# Patient Record
Sex: FEMALE | Race: WHITE | Employment: OTHER | URBAN - METROPOLITAN AREA
[De-identification: names, ages, dates, MRNs, and addresses within clinical notes are randomized per-mention and may not be internally consistent; named-entity substitution may affect disease eponyms.]

---

## 2023-09-23 RX ORDER — CITALOPRAM 10 MG/1
TABLET ORAL
COMMUNITY

## 2023-09-23 RX ORDER — ROSUVASTATIN CALCIUM 5 MG/1
TABLET, COATED ORAL
COMMUNITY

## 2023-09-23 RX ORDER — CETIRIZINE HYDROCHLORIDE 10 MG/1
TABLET ORAL
COMMUNITY

## 2023-09-23 RX ORDER — MULTIVITAMIN/IRON/FOLIC ACID 18MG-0.4MG
TABLET ORAL
COMMUNITY

## 2023-09-23 RX ORDER — LOSARTAN POTASSIUM 100 MG/1
TABLET ORAL
COMMUNITY

## 2023-10-05 ENCOUNTER — APPOINTMENT (OUTPATIENT)
Dept: ORTHOPEDIC SURGERY | Facility: HOSPITAL | Age: 78
End: 2023-10-05
Payer: MEDICARE

## 2023-10-12 ENCOUNTER — OFFICE VISIT (OUTPATIENT)
Dept: ORTHOPEDIC SURGERY | Facility: HOSPITAL | Age: 78
End: 2023-10-12
Payer: MEDICARE

## 2023-10-12 DIAGNOSIS — M65.331 TRIGGER MIDDLE FINGER OF RIGHT HAND: Primary | ICD-10-CM

## 2023-10-12 PROCEDURE — 1036F TOBACCO NON-USER: CPT | Performed by: ORTHOPAEDIC SURGERY

## 2023-10-12 PROCEDURE — 1160F RVW MEDS BY RX/DR IN RCRD: CPT | Performed by: ORTHOPAEDIC SURGERY

## 2023-10-12 PROCEDURE — 1159F MED LIST DOCD IN RCRD: CPT | Performed by: ORTHOPAEDIC SURGERY

## 2023-10-12 PROCEDURE — 99213 OFFICE O/P EST LOW 20 MIN: CPT | Performed by: ORTHOPAEDIC SURGERY

## 2023-10-12 ASSESSMENT — PATIENT HEALTH QUESTIONNAIRE - PHQ9
SUM OF ALL RESPONSES TO PHQ9 QUESTIONS 1 AND 2: 0
1. LITTLE INTEREST OR PLEASURE IN DOING THINGS: NOT AT ALL
2. FEELING DOWN, DEPRESSED OR HOPELESS: NOT AT ALL

## 2023-10-12 NOTE — PROGRESS NOTES
Reason for Appointment  Chief Complaint   Patient presents with    Left Middle Finger - Trigger Finger    Right Middle Finger - Trigger Finger       History of Present Illness  Patient is a 78 y.o. female here today for follow-up evaluation of with great improvement right and left long fingers but with more improvement in the left long finger with no significant symptoms still has some mild catching in the right long finger but this is the digit that had a flexion contracture.  Very happy with results still using a night splint which helps.  Pain much better.  No other changes in her past medical history allergies and medications.     History reviewed. No pertinent past medical history.    History reviewed. No pertinent surgical history.    Medication Documentation Review Audit       Reviewed by Xiomara Calloway MA (Medical Assistant) on 10/12/23 at 1309      Medication Order Taking? Sig Documenting Provider Last Dose Status   AMLODIPINE BENZOATE ORAL [id number] Yes amLODIPine Benzoate Historical Provider, MD Taking Active   b complex 0.4 mg tablet [id number] Yes Vitamin B Complex Historical Provider, MD Taking Active   cetirizine (ZyrTEC) 10 mg tablet [id number] Yes Cetirizine HCl Historical Provider, MD Taking Active   citalopram (CeleXA) 10 mg tablet [id number] Yes Citalopram Hydrobromide Historical Provider, MD Taking Active   losartan (Cozaar) 100 mg tablet [id number] Yes Losartan Potassium Historical Provider, MD Taking Active   metformin HCl (METFORMIN ORAL) [id number] Yes Metformin Historical Provider, MD Taking Active   OXYBUTYNIN TD [id number] Yes Oxybutynin Historical Provider, MD Taking Active   rosuvastatin (Crestor) 5 mg tablet [id number] Yes Rosuvastatin Calcium Historical Provider, MD Taking Active                    Allergies   Allergen Reactions    Meloxicam Unknown    Solifenacin Succinate Unknown    Sulfa (Sulfonamide Antibiotics) Unknown       Review of Systems    Exam on exam today there is about a  20 degree flexion contracture of the right long finger that can be corrected to 10 degrees there is still some triggering this area but much less tenderness she can make a full fist in both long fingers no other triggering throughout the digits with just mild DJD in the hands good pulses good sensation    Assessment   Bilateral long finger trigger digits    Plan we did discuss future injections if this lasts for a few more months versus surgical intervention which can be done under local anesthesia, she will return when symptoms recur

## 2024-09-19 ENCOUNTER — OFFICE VISIT (OUTPATIENT)
Dept: ORTHOPEDIC SURGERY | Facility: HOSPITAL | Age: 79
End: 2024-09-19
Payer: MEDICARE

## 2024-09-19 DIAGNOSIS — M65.332 TRIGGER MIDDLE FINGER OF LEFT HAND: Primary | ICD-10-CM

## 2024-09-19 DIAGNOSIS — M19.042 DEGENERATIVE ARTHRITIS OF MIDDLE FINGER OF LEFT HAND: ICD-10-CM

## 2024-09-19 PROCEDURE — 2500000005 HC RX 250 GENERAL PHARMACY W/O HCPCS: Performed by: ORTHOPAEDIC SURGERY

## 2024-09-19 PROCEDURE — 99213 OFFICE O/P EST LOW 20 MIN: CPT | Performed by: ORTHOPAEDIC SURGERY

## 2024-09-19 PROCEDURE — 76942 ECHO GUIDE FOR BIOPSY: CPT | Performed by: ORTHOPAEDIC SURGERY

## 2024-09-19 ASSESSMENT — PAIN - FUNCTIONAL ASSESSMENT: PAIN_FUNCTIONAL_ASSESSMENT: 0-10

## 2024-09-19 ASSESSMENT — PAIN SCALES - GENERAL: PAINLEVEL_OUTOF10: 0 - NO PAIN

## 2024-09-19 NOTE — PROGRESS NOTES
Reason for Appointment  Chief Complaint   Patient presents with    Left Hand - Trigger Finger     History of Present Illness  Patient is a 79 y.o. female here today for follow-up evaluation of the left hand. Pt was last seen on 10/12/2023. Over the past couple of months the triggering has happened again in the left middle finger. She is noticing a small flexion contracture. No numbness or injuries. No other changes in past medical history, allergies, or medications.  Pain in the fingers worse with gripping better with rest she did get a small flexion contracture in the ring finger, but no longer has any triggering      History reviewed. No pertinent past medical history.    History reviewed. No pertinent surgical history.    Medication Documentation Review Audit       Reviewed by Xiomara Koroma MA (Medical Assistant) on 09/19/24 at 1345      Medication Order Taking? Sig Documenting Provider Last Dose Status   AMLODIPINE BENZOATE ORAL [id number] Yes amLODIPine Benzoate Historical Provider, MD Taking Active   b complex 0.4 mg tablet [id number] Yes Vitamin B Complex Historical Provider, MD Taking Active   cetirizine (ZyrTEC) 10 mg tablet [id number] Yes Cetirizine HCl Historical Provider, MD Taking Active   citalopram (CeleXA) 10 mg tablet [id number] Yes Citalopram Hydrobromide Historical Provider, MD Taking Active   losartan (Cozaar) 100 mg tablet [id number] Yes Losartan Potassium Historical Provider, MD Taking Active   metformin HCl (METFORMIN ORAL) [id number] Yes Metformin Historical Provider, MD Taking Active   OXYBUTYNIN TD [id number] Yes Oxybutynin Historical Provider, MD Taking Active   rosuvastatin (Crestor) 5 mg tablet [id number] Yes Rosuvastatin Calcium Historical Provider, MD Taking Active                    Allergies   Allergen Reactions    Meloxicam Unknown    Solifenacin Succinate Unknown    Sulfa (Sulfonamide Antibiotics) Unknown       Review of Systems    Exam   Tender A1 pulley left long finger. Mild flexion  contracture left long finger. Palpable triggering. Good cap refill. Good sensation throughout the hand. Mild arthritic changes seen.    Assessment   Left arthritic middle finger  Left trigger long finger     Plan   We are going to inject her long finger today, but did discuss the benefits and risks of operative treatment. Since she responded well to the last injection pt wants to go the injection route today. Follow up as needed.    Patient ID: Good John is a 79 y.o. female.    Hand / UE Inj/Asp: L long A1 for trigger finger on 9/19/2024 2:04 PM  Indications: pain  Details: 25 G needle, ultrasound-guided  Medications: 1 mL lidocaine 10 mg/mL (1 %)  Outcome: tolerated well, no immediate complications    After discussing the risks and benefits of the procedure we proceeded with the injection. Under ultrasound guidance we identified the metacarpal bone and overlying tendon sheath with the FDS and FDP tendons, images obtained. We then sterilely injected the left long finger A1 pulley with a mixture of 20 mg of DepoMedrol and .5 cc of 1% lidocaine. Pt tolerated the procedure well without any adverse reactions.    Procedure, treatment alternatives, risks and benefits explained, specific risks discussed. Consent was given by the patient. Immediately prior to procedure a time out was called to verify the correct patient, procedure, equipment, support staff and site/side marked as required. Patient was prepped and draped in the usual sterile fashion.           I, Rhiannon Rene, attest that this documentation has been prepared under the direction and in the presence of Tate Diop MD. By signing below, ITate MD, personally performed the services described in this documentation. All medical record entries made by the scribe were at my direction and in my presence. I have reviewed the chart and agree that the record reflects my personal performance and is accurate and complete.

## 2024-09-20 RX ORDER — LIDOCAINE HYDROCHLORIDE 10 MG/ML
1 INJECTION, SOLUTION INFILTRATION; PERINEURAL
Status: COMPLETED | OUTPATIENT
Start: 2024-09-19 | End: 2024-09-19